# Patient Record
Sex: FEMALE | Race: WHITE
[De-identification: names, ages, dates, MRNs, and addresses within clinical notes are randomized per-mention and may not be internally consistent; named-entity substitution may affect disease eponyms.]

---

## 2018-08-02 ENCOUNTER — HOSPITAL ENCOUNTER (OUTPATIENT)
Dept: HOSPITAL 80 - FSGY | Age: 78
Discharge: HOME | End: 2018-08-02
Attending: SURGERY
Payer: COMMERCIAL

## 2018-08-02 VITALS — DIASTOLIC BLOOD PRESSURE: 76 MMHG | SYSTOLIC BLOOD PRESSURE: 150 MMHG

## 2018-08-02 DIAGNOSIS — I12.0: ICD-10-CM

## 2018-08-02 DIAGNOSIS — N18.6: Primary | ICD-10-CM

## 2018-08-02 DIAGNOSIS — N25.81: ICD-10-CM

## 2018-08-02 DIAGNOSIS — E03.9: ICD-10-CM

## 2018-08-02 LAB — PLATELET # BLD: 188 10^3/UL (ref 150–400)

## 2018-08-02 NOTE — PDANEPAE
ANE Past Medical History





- Cardiovascular History


Hx Hypertension: Yes


Hx Arrhythmias: No


Hx Chest Pain: No


Hx Coronary Artery / Peripheral Vascular Disease: No


Hx CHF / Valvular Disease: No


Hx Palpitations: No


Cardiovascular History Comment: high chol.  pcp monitors bp medications





- Pulmonary History


Hx Asthma/Reactive Airway Disease: Yes


Hx Oxygen in Use at Home: Yes


O2 in Use at Home (L/minute): 2l cont


Hx Sleep Apnea: No


Sleep Apnea Screening Result - Last Documented: Negative


Pulmonary History Comment: hx of asthma when she lived in [place], no 

problems now that she lives in [place].  has been on o2 since she was 

hospitalized 1.5 yrs ago





- Neurologic History


Hx Cerebrovascular Accident: No


Hx Seizures: No


Hx Dementia: No





- Endocrine History


Hx Diabetes: No


Endocrine History Comment: hypothyroidism





- Renal History


Hx Renal Disorders: Yes


Renal History Comment: ckd- doctors told her she is down to 15-18%.  [facility] following pt





- Liver History


Hx Hepatic Disorders: No





- Neurological & Psychiatric Hx


Hx Neurological and Psychiatric Disorders: Yes


Neurological / Psychiatric History Comment: anxious at times





- Cancer History


Hx Cancer: No





- Congenital Disorder History


Hx Congenital Disorders: No





- GI History


Hx Gastrointestinal Disorders: No





- Other Health History


Other Health History: wears reading glasses.  full set of dentures





- Chronic Pain History


Chronic Pain: No





- Surgical History


Prior Surgeries: bilateral vein stripping.  d&c





ANE Review of Systems


Review of Systems: 








- Exercise capacity


METS (RN): 3 METS





ANE Patient History





- Allergies


Allergies/Adverse Reactions: 








atorvastatin Allergy (Verified 08/01/18 15:36)


 Unknown


codeine Allergy (Verified 08/01/18 15:36)


 makes me nauseous


corn Allergy (Verified 08/01/18 15:36)


 Diarrhea


hydrochlorothiazide Allergy (Verified 08/01/18 15:36)


 Unknown


lisinopril Allergy (Verified 08/01/18 15:36)


 cough


Sulfa (Sulfonamide Antibiotics) Allergy (Verified 08/01/18 15:36)


 makes me confused when i take it


tramadol Allergy (Verified 08/01/18 15:36)


 makes me sick to my stomach








- Home Medications


Home Medications: 








Allopurinol  08/01/18 [Last Taken 08/01/18]


Amlodipine Besylate  08/01/18 [Last Taken 08/01/18]


Aspirin 81mg (*)  08/01/18 [Last Taken 08/01/18]


Flonase Allergy Relief  08/01/18 [Last Taken 08/01/18]


Furosemide  08/01/18 [Last Taken 08/01/18]


Gemfibrozil  08/01/18 [Last Taken 08/01/18]


Herbals/Supplements -Info Only  08/01/18 [Last Taken 08/01/18]


Latanoprost  08/01/18 [Last Taken 08/01/18]


Levothyroxine  08/01/18 [Last Taken 08/01/18]


Metoprolol Tartrate  08/01/18 [Last Taken 08/01/18]


Pravastatin Sodium  08/01/18 [Last Taken 08/01/18]








- NPO status


NPO Since - Liquids (Date): 08/03/18


NPO Since - Liquids (Time): 23:55


NPO Since - Solids (Date): 08/01/18


NPO Since - Solids (Time): 22:00





- Smoking Hx


Smoking Status: Never smoked





- Family Anes Hx


Family Hx Anesthesia Complications: none





ANE Labs/Vital Signs





- Labs


Result Diagrams: 


 08/02/18 06:13





 08/02/18 06:13





- Vital Signs


Blood Pressure: 154/79


Heart Rate: 63


Respiratory Rate: 16


O2 Sat (%): 94


Height: 165.1 cm


Weight: 86.183 kg





ANE Physical Exam





- Airway


Mallampati Score: Class 3


Mouth exam: dentures





- ASA Status


ASA Status: III





ANE Anesthesia Plan


Anesthesia Plan: GA w LMA

## 2018-08-02 NOTE — CPEKG
Heart Rate: 61

RR Interval: 984

P-R Interval: 308

QRSD Interval: 100

QT Interval: 452

QTC Interval: 456

P Axis: 51

QRS Axis: -31

T Wave Axis: 66

EKG Severity - ABNORMAL ECG -

EKG Impression: SINUS RHYTHM

EKG Impression: FIRST DEGREE AV BLOCK

EKG Impression: LEFT AXIS DEVIATION

Electronically Signed By: Venus Saldana 02-Aug-2018 10:18:13

## 2018-08-02 NOTE — POSTOPPROG
Post Op Note


Date of Operation: 08/02/18


Surgeon: Osmin Berry


Assistant: Alise


Anesthesiologist: Karina


Anesthesia: GET(General Endotracheal)


Pre-op Diagnosis: ESRD


Post-op Diagnosis: same


Indication: same


Procedure: LUE brachiobasilic AVF


Findings: Good thrill, good radial pulse


Inf/Abcess present in the surg proc area at time of surgery?: No


Depth: Deep Incisional (Fascial)


EBL: Minimal

## 2018-08-04 NOTE — GOP
[f rep st]



                                                                OPERATIVE REPORT





DATE OF OPERATION:  08/02/2018



SURGEON:  Osmin Berry MD



ASSISTANT:  Saira Carrero, Nurse Practitioner.



ANESTHESIOLOGIST:  Dr. Collins.



PREOPERATIVE DIAGNOSIS:  Chronic renal failure.



POSTOPERATIVE DIAGNOSIS:  Chronic renal failure.



PROCEDURE PERFORMED:  Ultrasound vein mapping of the left arm with a left brachial basilic arterioven
ous fistula.



FINDINGS:  The patient was found to have excellent basilic vein in the upper arm.  Her cephalic vein 
at the wrist occluded approximately 5 cm above the wrist and is not suitable for fistula.  The cephal
ic vein in the upper arm is also quite small and tends to occlude approximately 8 cm above the antecu
bital space.





DESCRIPTION OF PROCEDURE:  Patient was taken to the operating room, where she received satisfactory g
eneral endotracheal anesthesia.  She was placed in supine position with the left arm outstretched on 
armboard, prepped and draped in usual sterile fashion.  Ultrasound was used to map the veins in the a
rm as noted above, and it was elected to use the basilic vein is the best possible option.  A curvili
near incision was made in the antecubital space.  Dissection was carried down through the subcutaneou
s tissue.  The basilic vein was dissected free and controlled with vessel loop.  The biceps aponeuros
is fascia was divided exposing the brachial artery, which was dissected free and encircled with vesse
l loops.  She was systemically heparinized and then the basilic vein was ligated distally with 2-0 si
lk ties and then rotated over the artery where an end-to-side anastomosis was done creating a 7 mm an
astomosis with a running 6-0 Prolene suture.  Flow was first established through the AV fistula then 
back down the hand.  She managed to preserve her radial pulse as well as good flow in the fistula.  H
emostasis was carefully obtained.  The wound was irrigated.  Some topical thrombin was placed in the 
wound.  The heparin was reversed with protamine.  The wound was infiltrated with 0.5% Marcaine and cl
osed in layers using 3-0 Vicryl for the subcutaneous tissue, 4-0 Monocryl subcuticular stitch for the
 skin.  There were no complications.  Blood loss negligible.



Copy requested to:

Dr. Jason Hernández



Job #:  [id number]